# Patient Record
Sex: FEMALE | Race: WHITE | Employment: STUDENT | URBAN - METROPOLITAN AREA
[De-identification: names, ages, dates, MRNs, and addresses within clinical notes are randomized per-mention and may not be internally consistent; named-entity substitution may affect disease eponyms.]

---

## 2023-06-21 NOTE — PROGRESS NOTES
Name: Francisca Cochran  Date: 6/22/2023  YOB: 2000  LMP: Patient's last menstrual period was 06/20/2023. Irais Martin is a 26 y.o. G0  who is here for a right breast issue. She has noticed dimpling over the last few months. She has gained weight ( she was suppose to have her first annual but is having her period)    Birth control: condoms  Menstrual status: heavy and cramping  Gyn Surgery: none    Health Maintenance:  Pap Smear: last pap in  never  HPV vaccine: not done  If 36 or older, Mammo: never done  If 39 or older, Colonoscopy: not needed, age less than 39  If postmenopausal, Dexa scan: not needed    Review of Systems   Constitutional: Negative. HENT: Negative. Eyes: Negative. Respiratory: Negative. Cardiovascular: Negative. Gastrointestinal: Negative. Endocrine: Negative. Genitourinary: Negative. Musculoskeletal: Negative. Skin: Negative. Allergic/Immunologic: Negative. Hematological: Negative. Psychiatric/Behavioral: Negative. Negative for self-injury and suicidal ideas. No past medical history on file. Past Surgical History:  No date: ORTHOPEDIC SURGERY      Comment:  broken finger  No date: OTHER SURGICAL HISTORY      Comment:  lymph node removed from neck       Current Outpatient Medications:     busPIRone (BUSPAR) 5 MG tablet, Take one tablet once daily, Disp: , Rfl:     escitalopram (LEXAPRO) 5 MG tablet, Take 1 tablet by mouth daily, Disp: , Rfl:     Family Cancer History:   Cancer-related family history includes Breast Cancer in an other family member. There is no history of Colon Cancer or Ovarian Cancer. Social History:  reports that she has never smoked. She has never used smokeless tobacco. She reports that she does not drink alcohol and does not use drugs. Ob History:  No obstetric history on file. Sexual History:  has no history on file for sexual activity.     PHYSICAL EXAM:  Vitals:  height is 5' 7\" (1.702 m) and weight is

## 2023-06-22 ENCOUNTER — OFFICE VISIT (OUTPATIENT)
Dept: OBGYN CLINIC | Age: 23
End: 2023-06-22
Payer: COMMERCIAL

## 2023-06-22 VITALS — BODY MASS INDEX: 36.41 KG/M2 | HEIGHT: 67 IN | WEIGHT: 232 LBS

## 2023-06-22 DIAGNOSIS — R23.4 BREAST SKIN CHANGES: Primary | ICD-10-CM

## 2023-06-22 PROCEDURE — G8427 DOCREV CUR MEDS BY ELIG CLIN: HCPCS | Performed by: OBSTETRICS & GYNECOLOGY

## 2023-06-22 PROCEDURE — G8417 CALC BMI ABV UP PARAM F/U: HCPCS | Performed by: OBSTETRICS & GYNECOLOGY

## 2023-06-22 PROCEDURE — 1036F TOBACCO NON-USER: CPT | Performed by: OBSTETRICS & GYNECOLOGY

## 2023-06-22 PROCEDURE — 99213 OFFICE O/P EST LOW 20 MIN: CPT | Performed by: OBSTETRICS & GYNECOLOGY

## 2023-06-22 ASSESSMENT — ENCOUNTER SYMPTOMS
EYES NEGATIVE: 1
RESPIRATORY NEGATIVE: 1
GASTROINTESTINAL NEGATIVE: 1
ALLERGIC/IMMUNOLOGIC NEGATIVE: 1